# Patient Record
Sex: MALE | Race: BLACK OR AFRICAN AMERICAN | NOT HISPANIC OR LATINO | Employment: FULL TIME | URBAN - METROPOLITAN AREA
[De-identification: names, ages, dates, MRNs, and addresses within clinical notes are randomized per-mention and may not be internally consistent; named-entity substitution may affect disease eponyms.]

---

## 2024-10-18 ENCOUNTER — APPOINTMENT (OUTPATIENT)
Dept: PRIMARY CARE | Facility: CLINIC | Age: 19
End: 2024-10-18

## 2024-10-18 VITALS
SYSTOLIC BLOOD PRESSURE: 117 MMHG | OXYGEN SATURATION: 98 % | HEIGHT: 71 IN | BODY MASS INDEX: 35.48 KG/M2 | WEIGHT: 253.4 LBS | DIASTOLIC BLOOD PRESSURE: 78 MMHG | HEART RATE: 75 BPM

## 2024-10-18 DIAGNOSIS — F32.A ANXIETY AND DEPRESSION: ICD-10-CM

## 2024-10-18 DIAGNOSIS — Z76.89 ENCOUNTER TO ESTABLISH CARE: Primary | ICD-10-CM

## 2024-10-18 DIAGNOSIS — G43.009 MIGRAINE WITHOUT AURA AND WITHOUT STATUS MIGRAINOSUS, NOT INTRACTABLE: ICD-10-CM

## 2024-10-18 DIAGNOSIS — E88.819 INSULIN RESISTANCE: ICD-10-CM

## 2024-10-18 DIAGNOSIS — J45.40 MODERATE PERSISTENT ASTHMA WITHOUT COMPLICATION (HHS-HCC): ICD-10-CM

## 2024-10-18 DIAGNOSIS — F41.9 ANXIETY AND DEPRESSION: ICD-10-CM

## 2024-10-18 DIAGNOSIS — E55.9 VITAMIN D DEFICIENCY: ICD-10-CM

## 2024-10-18 DIAGNOSIS — F90.2 ATTENTION DEFICIT HYPERACTIVITY DISORDER (ADHD), COMBINED TYPE: ICD-10-CM

## 2024-10-18 DIAGNOSIS — Z00.00 HEALTHCARE MAINTENANCE: ICD-10-CM

## 2024-10-18 DIAGNOSIS — F84.0 AUTISTIC DISORDER (HHS-HCC): ICD-10-CM

## 2024-10-18 DIAGNOSIS — G70.00 OCULAR MYASTHENIA (MULTI): Chronic | ICD-10-CM

## 2024-10-18 PROCEDURE — 99204 OFFICE O/P NEW MOD 45 MIN: CPT | Performed by: STUDENT IN AN ORGANIZED HEALTH CARE EDUCATION/TRAINING PROGRAM

## 2024-10-18 PROCEDURE — 3008F BODY MASS INDEX DOCD: CPT | Performed by: STUDENT IN AN ORGANIZED HEALTH CARE EDUCATION/TRAINING PROGRAM

## 2024-10-18 RX ORDER — ALBUTEROL SULFATE 90 UG/1
2 INHALANT RESPIRATORY (INHALATION)
COMMUNITY

## 2024-10-18 RX ORDER — PYRIDOSTIGMINE BROMIDE 180 MG/1
TABLET, EXTENDED RELEASE ORAL
COMMUNITY
Start: 2023-10-04

## 2024-10-18 RX ORDER — ERGOCALCIFEROL 1.25 MG/1
50000 CAPSULE ORAL WEEKLY
COMMUNITY

## 2024-10-18 RX ORDER — VIT C/E/ZN/COPPR/LUTEIN/ZEAXAN 250MG-90MG
5000 CAPSULE ORAL
COMMUNITY
End: 2024-10-18 | Stop reason: SDUPTHER

## 2024-10-18 ASSESSMENT — ENCOUNTER SYMPTOMS
MYALGIAS: 0
VOMITING: 0
NAUSEA: 0
SHORTNESS OF BREATH: 0
HEADACHES: 0
LIGHT-HEADEDNESS: 0
ARTHRALGIAS: 0
DIZZINESS: 0
ABDOMINAL PAIN: 0
LOSS OF SENSATION IN FEET: 0
CHILLS: 0
FEVER: 0
FATIGUE: 0
COUGH: 0
DEPRESSION: 0
CONSTIPATION: 0
OCCASIONAL FEELINGS OF UNSTEADINESS: 0
DIARRHEA: 0

## 2024-10-18 ASSESSMENT — PATIENT HEALTH QUESTIONNAIRE - PHQ9
10. IF YOU CHECKED OFF ANY PROBLEMS, HOW DIFFICULT HAVE THESE PROBLEMS MADE IT FOR YOU TO DO YOUR WORK, TAKE CARE OF THINGS AT HOME, OR GET ALONG WITH OTHER PEOPLE: NOT DIFFICULT AT ALL
1. LITTLE INTEREST OR PLEASURE IN DOING THINGS: NOT AT ALL
2. FEELING DOWN, DEPRESSED OR HOPELESS: NOT AT ALL
SUM OF ALL RESPONSES TO PHQ9 QUESTIONS 1 AND 2: 0

## 2024-10-18 NOTE — ASSESSMENT & PLAN NOTE
Following with [facility] with therapy. He is being seen for his ADHD and depression and anxiety and Autism Spectrum.

## 2024-10-18 NOTE — ASSESSMENT & PLAN NOTE
Attention deficit disorder with hyperactivity. Following with [facility] with therapy. He is being seen for his ADHD and depression and anxiety and Autism Spectrum.

## 2024-10-18 NOTE — ASSESSMENT & PLAN NOTE
Following with [facility] with therapy. He is being seen for his ADHD and depression and anxiety and Autism Spectrum.

## 2024-10-18 NOTE — ASSESSMENT & PLAN NOTE
with past IV IG therapy, chronic steroids, Mestinon  Typically over the course of the day. Overall stable. Well controlled. Had been on pyridostigmine in the past but not in the past 2 years. Was following with ophthalmology at [facility]. Will need a new referral.

## 2024-10-18 NOTE — PROGRESS NOTES
"Subjective   Patient ID: Efrain Serrato is a 19 y.o. male who presents for Establish Care (Patient is here to establish care. Patient used to see Dr Pope).    HPI     Previously following with [facility] and current physician at [facility].    He does have asthma and has an albuterol inhaler as needed.  He only needs to use it as needed. He states about a few times a month he needs to use it.  He has never needed any additional inhalers for the asthma.    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  He had been following with pediatric ophthalmology. He has not followed with them for a couple of years.  He is interested in getting a new referral to ophthalmology.  He denies any acute symptoms at least at this time.  He also states that he has been not on any medication for couple of years and overall seems to be doing okay.    He was thought to have sleep apnea when he was about 9 years old.  No current symptoms.    No recent lab work.      Review of Systems   Constitutional:  Negative for chills, fatigue and fever.   Respiratory:  Negative for cough and shortness of breath.    Cardiovascular:  Negative for chest pain.   Gastrointestinal:  Negative for abdominal pain, constipation, diarrhea, nausea and vomiting.   Musculoskeletal:  Negative for arthralgias and myalgias.   Neurological:  Negative for dizziness, light-headedness and headaches.       Objective   /78   Pulse 75   Ht 1.791 m (5' 10.5\")   Wt 115 kg (253 lb 6.4 oz)   SpO2 98%   BMI 35.85 kg/m²     Physical Exam  Vitals and nursing note reviewed.   Constitutional:       General: He is not in acute distress.     Appearance: Normal appearance. He is normal weight. He is not ill-appearing or toxic-appearing.   HENT:      Head: Normocephalic and atraumatic.   Cardiovascular:      Rate and Rhythm: Normal rate and regular rhythm.      Heart sounds: Normal heart sounds.   Pulmonary:      Effort: Pulmonary effort is normal.      Breath sounds: Normal breath " sounds.   Neurological:      Mental Status: He is alert.         Assessment/Plan   Problem List Items Addressed This Visit             ICD-10-CM    Moderate persistent asthma without complication ([facility]) J45.40     Chronic. Stable. Well controlled with albuterol as needed.         Migraine without aura G43.009     Reports chronic. Only a few times a year. Responds to over the counter medications like Tylenol.         Relevant Orders    Comprehensive Metabolic Panel    Ocular myasthenia (Multi) (Chronic) G70.00     with past IV IG therapy, chronic steroids, Mestinon  Typically over the course of the day. Overall stable. Well controlled. Had been on pyridostigmine in the past but not in the past 2 years. Was following with ophthalmology at [facility]. Will need a new referral.         Insulin resistance E88.819    Relevant Orders    Hemoglobin A1C    Attention deficit hyperactivity disorder (ADHD) F90.9     Attention deficit disorder with hyperactivity. Following with [facility] with therapy. He is being seen for his ADHD and depression and anxiety and Autism Spectrum.         Autistic disorder ([facility]) F84.0     Following with [facility] with therapy. He is being seen for his ADHD and depression and anxiety and Autism Spectrum.         Anxiety and depression F41.9, F32.A     Following with [facility] with therapy. He is being seen for his ADHD and depression and anxiety and Autism Spectrum.          Other Visit Diagnoses         Codes    Encounter to establish care    -  Primary Z76.89    Healthcare maintenance     Z00.00    Relevant Orders    CBC and Auto Differential    Comprehensive Metabolic Panel    Lipid Panel    TSH with reflex to Free T4 if abnormal    Vitamin D 25-Hydroxy,Total (for eval of Vitamin D levels)    Vitamin D deficiency     E55.9    Relevant Orders    Vitamin D 25-Hydroxy,Total (for eval of Vitamin D levels)          History and physical examination as above.  Patient coming in to  establish care.  Updated some of the patient's medical history.  Patient will continue with regular follow-up with [facility] for regular therapy.  Referral placed to ophthalmology for his ocular myasthenia.  He was previously following with pediatric ophthalmology at [facility].  Lab work orders placed for the patient to be done fasting.  We will follow-up on these at the next appointment.  Will plan for a wellness examination later in the year and patient will get scheduled for this today.  Given a work note for the patient.  He will call in sooner with any other acute concerns or complaints.

## 2024-10-18 NOTE — LETTER
October 18, 2024     Patient: Efrain Serrato   YOB: 2005   Date of Visit: 10/18/2024       To Whom It May Concern:    Efrain Serrato was seen in my clinic on 10/18/2024. Please excuse Efrain for his absence from work on this day to make the appointment.    If you have any questions or concerns, please don't hesitate to call.         Sincerely,         Miguel Linton,         CC: No Recipients

## 2024-10-18 NOTE — PATIENT INSTRUCTIONS
Thank you for coming in and establishing care with us today in the office.    I went ahead and updated some of her medical history today.    Please continue with regular follow-up with [facility] with regular therapy.    For your ocular myasthenia, I am to go ahead and place a new referral to ophthalmology for you.  Since this is an outside referral, please call us in the next 1 to 2 weeks if you do not hear from their office about getting set up for an appointment.  It sounds acute previously been established with [facility] in the past but you have not had follow-up for a couple of years.  Please call us back if you are having any issues getting scheduled.    I am going to go ahead and order some additional lab work orders for you as well.  Please make sure that you are fasting for about 10 hours before getting the labs done.  Please use any [facility] laboratory and there is a  lab located on the first floor of this building.  They are only open on weekdays from about 6:30 AM to about 4:30 PM.  You do not need an appointment for the lab work.  They can look you up in the system and see if the labs that I have ordered for you today.  We will follow-up in regards to those results.    We will go ahead and provide you for a work note today as well.    We can plan for a follow-up either later this year or after the start of the year for regular wellness examination.  please get set up with this appointment before you leave here today.    Please call with any additional questions or concerns.    Thank you